# Patient Record
(demographics unavailable — no encounter records)

---

## 2024-10-30 NOTE — ASSESSMENT
Pt adequately sedated.  Final time out done and agreed by all staff.    [FreeTextEntry1] : - chano straps PRN. - advance activity as pain allows. - recommend Voltaren gel PRN pain.   F/u PRN.

## 2024-10-30 NOTE — IMAGING
[de-identified] : LEFT HAND skin intact. no swelling. no TTP. good EPL, FPL. fingers good extension, flex to full fist. no scissoring. good finger abduction, adduction.  SILT to median, ulnar, radial distribution.  brisk cap refill all digits. no triggering.  3rd MPJ: pain with ulnar deviation.

## 2024-10-30 NOTE — HISTORY OF PRESENT ILLNESS
[de-identified] : 10/30/24: f/u LEFT 3rd MPJ sprain. wearing chano straps intermittently.  Did not start OT due to [employer] holidays. Performing HEP, reports improved ROM and strength. Pain improving. Not currently taking any medication for this.   9/25/24: f/u MRI. f/u LEFT middle finger MPJ sprain.  MRI L hand @R 9/6/24 - IMPRESSION: 1. Grade 1 sprain versus partial metacarpal insertional tear of the middle MCP radial collateral ligament. 2. Small effusion / mild synovitis.  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   Last seen 10/11/23 for LEFT index finger partial RCL tear => splint then chano straps, OT. Doing well.  PMH: pre-diabetic. CAD s/p stents x 2 (04/2013 and 06/2022) - on Brilinta/baby aspirin. HTN. [FreeTextEntry5] : ZAK is here today to follow up on his LEFT middle finger. due to busy month, he was unable to attend therapy. doing HEP, able to make a fist. intermittent pain.